# Patient Record
Sex: MALE | Race: WHITE | Employment: STUDENT | URBAN - METROPOLITAN AREA
[De-identification: names, ages, dates, MRNs, and addresses within clinical notes are randomized per-mention and may not be internally consistent; named-entity substitution may affect disease eponyms.]

---
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
## 2025-01-18 ENCOUNTER — OFFICE VISIT (OUTPATIENT)
Dept: URGENT CARE | Facility: MEDICAL CENTER | Age: 20
End: 2025-01-18
Payer: COMMERCIAL

## 2025-01-18 VITALS
BODY MASS INDEX: 33.28 KG/M2 | DIASTOLIC BLOOD PRESSURE: 72 MMHG | TEMPERATURE: 98.9 F | SYSTOLIC BLOOD PRESSURE: 122 MMHG | WEIGHT: 242 LBS | RESPIRATION RATE: 20 BRPM | HEART RATE: 80 BPM | OXYGEN SATURATION: 98 %

## 2025-01-18 DIAGNOSIS — R21 RASH: Primary | ICD-10-CM

## 2025-01-18 DIAGNOSIS — R68.89 FLU-LIKE SYMPTOMS: ICD-10-CM

## 2025-01-18 PROCEDURE — 99214 OFFICE O/P EST MOD 30 MIN: CPT | Performed by: PHYSICIAN ASSISTANT

## 2025-01-18 RX ORDER — PREDNISONE 20 MG/1
40 TABLET ORAL DAILY
Qty: 10 TABLET | Refills: 0 | Status: SHIPPED | OUTPATIENT
Start: 2025-01-18 | End: 2025-01-23

## 2025-01-18 NOTE — LETTER
January 18, 2025     Patient: Carroll Nolne   YOB: 2005   Date of Visit: 1/18/2025       To Whom It May Concern:    It is my medical opinion that Carroll Nolen may return to work on 01/20/2025 .    If you have any questions or concerns, please don't hesitate to call.         Sincerely,        Dianna Tillman PA-C    CC: No Recipients

## 2025-01-18 NOTE — PROGRESS NOTES
[facility]        NAME: Carroll Nolen is a 19 y.o. male  : 2005    MRN: [id number]  DATE: 2025  TIME: 4:00 PM      Assessment and Plan     Rash [R21]  1. Rash  predniSONE 20 mg tablet      2. Flu-like symptoms            POC Testing Results        Note:       Patient Instructions     Patient Instructions   You may take OTC cough medication such as Mucinex DM for cough/congestion.         Follow up with primary care provider.   Go to ER if symptoms worsen.    Chief Complaint     Chief Complaint   Patient presents with    Flu Symptoms     Patient states for over a week he had sweats, chills, headache, fatigue; needs work note    Rash     Rash on face; started 3 days ago    Chills         History of Present Illness     Pt reports over a week ago he had vomiting, headache and felt feverish. He reports most symptoms has subsided, has residual cough and with hot flashes. He ednies shortness of breath or fever. He reports for the past 3 days rash to his face. He denies new medication, body products, etc. He reports history of rash to his face two years ago but not as severely. He reports rash itched in the shower but not currently.    Flu Symptoms  Associated symptoms include headaches, a fever, coughing, vomiting and a rash. Pertinent negatives include no congestion, ear pain, eye redness, rhinorrhea, sore throat, chest pain, shortness of breath, abdominal pain, diarrhea or nausea.   Rash  Associated symptoms include coughing, a fever and vomiting. Pertinent negatives include no congestion, diarrhea, rhinorrhea, shortness of breath or sore throat.       Review of Systems     Review of Systems   Constitutional:  Positive for chills and fever.   HENT:  Negative for congestion, ear pain, postnasal drip, rhinorrhea, sinus pressure, sinus pain, sore throat and voice change.    Eyes:  Negative for redness.   Respiratory:  Positive for cough. Negative for shortness of breath.    Cardiovascular:   Negative for chest pain.   Gastrointestinal:  Positive for vomiting. Negative for abdominal pain, diarrhea and nausea.   Skin:  Positive for color change and rash. Negative for pallor and wound.   Neurological:  Positive for headaches. Negative for seizures and syncope.   All other systems reviewed and are negative.        Current Medications       Current Outpatient Medications:     predniSONE 20 mg tablet, Take 2 tablets (40 mg total) by mouth daily for 5 days, Disp: 10 tablet, Rfl: 0    Current Allergies     Allergies as of 01/18/2025    (No Known Allergies)              The following portions of the patient's history were reviewed and updated as appropriate: allergies, current medications, past family history, past medical history, past social history, past surgical history, and problem list.     Past Medical History:   Diagnosis Date    ADHD     Anxiety     Depression 2020    Headache(784.0) 2020    sporadic migraines    Suicidal ideation 2021    ospitalized at [facility] [place] for 10 days. Was in a partial program thru [facility] for 3 hr a day for 2 weeks. Seeing a psychiatrist, Dr. Brody, in [place] ([facility])       History reviewed. No pertinent surgical history.    Family History   Problem Relation Age of Onset    Arthritis Mother         knees    Mental illness Father         He's in a nursing home/mental facility-hasn't been in Carroll's life since 2    Depression Father     Hypertension Father     Stroke Father     ADD / ADHD Father     Anxiety disorder Father     Bipolar disorder Father     Psychiatric Illness Father     Psychosis Father     Self-Injury Father     Suicide Attempts Father     Breast cancer Maternal Grandmother          Medications have been verified.        Objective     /72   Pulse 80   Temp 98.9 °F (37.2 °C) (Temporal)   Resp 20   Wt 110 kg (242 lb)   SpO2 98%   BMI 33.28 kg/m²   No LMP for male patient.         Physical Exam     Physical Exam  Vitals and  nursing note reviewed.   Constitutional:       General: He is not in acute distress.     Appearance: Normal appearance. He is not ill-appearing, toxic-appearing or diaphoretic.   HENT:      Head: Normocephalic and atraumatic.      Right Ear: Tympanic membrane, ear canal and external ear normal. There is no impacted cerumen.      Left Ear: Tympanic membrane, ear canal and external ear normal. There is no impacted cerumen.      Nose: Nose normal.      Mouth/Throat:      Lips: Pink. No lesions.      Mouth: Mucous membranes are moist.      Tongue: No lesions. Tongue does not deviate from midline.      Palate: No mass and lesions.      Pharynx: Oropharynx is clear. Uvula midline. No pharyngeal swelling, oropharyngeal exudate, posterior oropharyngeal erythema or uvula swelling.      Tonsils: No tonsillar exudate or tonsillar abscesses.   Eyes:      General: No scleral icterus.        Right eye: No discharge.         Left eye: No discharge.      Extraocular Movements: Extraocular movements intact.      Conjunctiva/sclera: Conjunctivae normal.      Pupils: Pupils are equal, round, and reactive to light.   Cardiovascular:      Rate and Rhythm: Normal rate and regular rhythm.      Heart sounds: Normal heart sounds. No murmur heard.     No friction rub. No gallop.   Pulmonary:      Effort: Pulmonary effort is normal. No respiratory distress.      Breath sounds: Normal breath sounds. No stridor. No wheezing, rhonchi or rales.   Musculoskeletal:      Cervical back: Normal range of motion and neck supple. No rigidity.   Lymphadenopathy:      Cervical: No cervical adenopathy.   Skin:     General: Skin is warm and dry.      Coloration: Skin is not jaundiced or pale.      Findings: Erythema and rash present. No bruising.      Comments: Maculopapular erythematous rash over forehead onto scalp and cheeks bilaterally, flaky, dry with cracked areas   Neurological:      General: No focal deficit present.      Mental Status: He is alert  and oriented to person, place, and time. Mental status is at baseline.   Psychiatric:         Mood and Affect: Mood normal.         Behavior: Behavior normal.